# Patient Record
Sex: FEMALE | Race: BLACK OR AFRICAN AMERICAN | Employment: STUDENT | URBAN - METROPOLITAN AREA
[De-identification: names, ages, dates, MRNs, and addresses within clinical notes are randomized per-mention and may not be internally consistent; named-entity substitution may affect disease eponyms.]

---
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
## 2021-01-05 ENCOUNTER — OFFICE VISIT (OUTPATIENT)
Dept: FAMILY MEDICINE | Facility: CLINIC | Age: 21
End: 2021-01-05
Payer: COMMERCIAL

## 2021-01-05 VITALS
TEMPERATURE: 99 F | HEIGHT: 66 IN | BODY MASS INDEX: 42.91 KG/M2 | RESPIRATION RATE: 20 BRPM | HEART RATE: 96 BPM | WEIGHT: 267 LBS | SYSTOLIC BLOOD PRESSURE: 118 MMHG | OXYGEN SATURATION: 96 % | DIASTOLIC BLOOD PRESSURE: 80 MMHG

## 2021-01-05 DIAGNOSIS — R10.30 LOWER ABDOMINAL PAIN: ICD-10-CM

## 2021-01-05 DIAGNOSIS — R10.2 PELVIC CRAMPING: Primary | ICD-10-CM

## 2021-01-05 DIAGNOSIS — K59.00 CONSTIPATION, UNSPECIFIED CONSTIPATION TYPE: ICD-10-CM

## 2021-01-05 LAB
ALBUMIN UR-MCNC: NEGATIVE MG/DL
APPEARANCE UR: CLEAR
BACTERIA #/AREA URNS HPF: ABNORMAL /HPF
BILIRUB UR QL STRIP: NEGATIVE
COLOR UR AUTO: YELLOW
GLUCOSE UR STRIP-MCNC: NEGATIVE MG/DL
HCG UR QL: NEGATIVE
HGB UR QL STRIP: NEGATIVE
KETONES UR STRIP-MCNC: NEGATIVE MG/DL
LEUKOCYTE ESTERASE UR QL STRIP: NEGATIVE
NITRATE UR QL: NEGATIVE
NON-SQ EPI CELLS #/AREA URNS LPF: ABNORMAL /LPF
PH UR STRIP: 6 PH (ref 5–7)
RBC #/AREA URNS AUTO: ABNORMAL /HPF
SOURCE: ABNORMAL
SP GR UR STRIP: 1.02 (ref 1–1.03)
SPECIMEN SOURCE: NORMAL
UROBILINOGEN UR STRIP-ACNC: 0.2 EU/DL (ref 0.2–1)
WBC #/AREA URNS AUTO: ABNORMAL /HPF
WET PREP SPEC: NORMAL

## 2021-01-05 PROCEDURE — 87591 N.GONORRHOEAE DNA AMP PROB: CPT | Performed by: NURSE PRACTITIONER

## 2021-01-05 PROCEDURE — 81025 URINE PREGNANCY TEST: CPT | Performed by: NURSE PRACTITIONER

## 2021-01-05 PROCEDURE — 87491 CHLMYD TRACH DNA AMP PROBE: CPT | Performed by: NURSE PRACTITIONER

## 2021-01-05 PROCEDURE — 99214 OFFICE O/P EST MOD 30 MIN: CPT | Performed by: NURSE PRACTITIONER

## 2021-01-05 PROCEDURE — 81001 URINALYSIS AUTO W/SCOPE: CPT | Performed by: NURSE PRACTITIONER

## 2021-01-05 PROCEDURE — 87210 SMEAR WET MOUNT SALINE/INK: CPT | Performed by: NURSE PRACTITIONER

## 2021-01-05 ASSESSMENT — PAIN SCALES - GENERAL: PAINLEVEL: SEVERE PAIN (7)

## 2021-01-05 ASSESSMENT — MIFFLIN-ST. JEOR: SCORE: 2006.82

## 2021-01-05 NOTE — PROGRESS NOTES
"  Assessment & Plan     Pelvic cramping  Patient primarily concerned about possible pregnancy, requests hcg testing. Agrees to additional evaluation, though limited. Declines any blood tests or imaging today.     UA unremarkable.  Urine hcg negative.  Wet prep negative.  GC/CT pending.   Supportive care/pain management reviewed (ie increased fluids, ibuprofen PRN), as well as symptoms that would indicate need for prompt medical attention.     Advise OB/GYN visit for postpartum eval and for current symptoms.   F/u pending results.   Discussed contraceptive initiation, does not wish to decide today.     - UA with Microscopic reflex to Culture  - HCG Qual, Urine ([id number])  - NEISSERIA GONORRHOEA PCR  - CHLAMYDIA TRACHOMATIS PCR  - Wet prep    Lower abdominal pain  Multiple possible etiologies.  Constipation is likely factor, though cannot exclude alternative cause.    As noted above, reviewed symptoms that would indicate need for further eval.   If today's results normal, recommend imaging and additional assessment. Patient understands plan.  As her primary intent today is r/o pregnancy, patient declines additional testing.     Constipation, unspecified constipation type  Ongoing.   Discussed increased fluids/fiber in diet, and reviewed foods known to contribute to constipative symptoms.  Decrease fast food intake, discussed.   miralax once daily x 2 wks then PRN for ongoing constipation.       Review of prior external note(s) from - [facility] information from [facility] reviewed  Diagnosis or treatment significantly limited by social determinants of health - financial strain, transportation difficulties  [id number]}     BMI:   Estimated body mass index is 42.77 kg/m  as calculated from the following:    Height as of this encounter: 1.683 m (5' 6.25\").    Weight as of this encounter: 121.1 kg (267 lb).       Return in about 1 week (around 1/12/2021), or if symptoms worsen or fail to " improve.    RENEE Nevarez CNP  [facility]     Kathy Quintana is a 20 year old who presents to clinic today for the following health issues.        HPI     Lower abdominal/pelvic pain    Onset/Duration: 2 weeks  Description:   Character: Cramping  Location: lower abdomen  Radiation: None  Intensity: moderate, severe  Progression of Symptoms:  worsening  Accompanying Signs & Symptoms:  Fever/Chills: no  Gas/Bloating: no  Nausea: yes  Vomitting: no  Diarrhea: no  Constipation: yes BM every 3-4 days  Dysuria or Hematuria: no  History:   Trauma: no  Previous similar pain: no  Previous tests done: none  Precipitating factors:   Does the pain change with:     Food: no    Bowel Movement: no    Urination: no   Other factors:  no  Therapies tried and outcome: heat pad without relief, Tylenol with mild relief    Above HPI reviewed, additional history below:     Patient notes worsening pelvic pain, bilateral, with accompanying nausea and lower abdominal pain.  Pain is present both at rest and with activity, not relieved with BMs or passing gas.   No dysuria, no change in urine character or frequency. Patient does note history of UTI, describes current pain as dissimilar to that associated with UTI.      Denies breast tenderness, no vaginal discharge.  No vomiting, no diarrhea. History constipation, ongoing.  Has used miralax in past but not recently.  Typically has BMs q3-4 days.     Patient has otherwise been afebrile, with no symptoms of systemic illness.    Delivered son approx 3 months ago via C/S at 41wks GA.  No complications at that time reported, though states that she has not returned for postpartum OB visit.      States no menstrual periods since patient's birth.    Sexually active, most recent intercourse yesterday.  Inconsistent protection.       Review of Systems   Constitutional, neuro, ENT, endocrine, pulmonary, cardiac, gastrointestinal,  "genitourinary, musculoskeletal, integument and psychiatric systems are negative, except as otherwise noted.      Objective    /80 (BP Location: Left arm, Patient Position: Chair, Cuff Size: Adult Large)   Pulse 96   Temp 99  F (37.2  C) (Oral)   Resp 20   Ht 1.683 m (5' 6.25\")   Wt 121.1 kg (267 lb)   LMP 11/15/2020 (Approximate)   SpO2 96%   BMI 42.77 kg/m    Body mass index is 42.77 kg/m .  Physical Exam   GENERAL: healthy, alert. Moves slowly, with caution - particularly when holding infant.   EYES: Eyes grossly normal to inspection, PERRL and conjunctivae and sclerae normal  HENT: ear canals and TM's normal, mouth without ulcers or lesions  NECK: no adenopathy, no asymmetry, masses, or scars and thyroid normal to palpation  RESP: lungs clear to auscultation - no rales, rhonchi or wheezes  CV: regular rate and rhythm, normal S1 S2,no murmur  ABDOMEN: soft, nondistended, no hepatosplenomegaly, no masses and bowel sounds normal. Mild tenderness to lower abdomen bilaterally.   MS: no gross musculoskeletal defects noted, no edema  NEURO: Normal strength and tone, mentation intact and speech normal  BACK: no CVA tenderness    See above A/P.         "

## 2021-01-06 ENCOUNTER — TELEPHONE (OUTPATIENT)
Dept: FAMILY MEDICINE | Facility: CLINIC | Age: 21
End: 2021-01-06

## 2021-01-06 DIAGNOSIS — R10.32 ABDOMINAL PAIN, LEFT LOWER QUADRANT: ICD-10-CM

## 2021-01-06 DIAGNOSIS — R10.2 PELVIC CRAMPING: Primary | ICD-10-CM

## 2021-01-06 DIAGNOSIS — R52 POSTPARTUM PAIN: ICD-10-CM

## 2021-01-06 NOTE — TELEPHONE ENCOUNTER
Please call patient:     Remaining lab results, gonorrhea and chlamydia testing, are negative.     Given all results to date, if pain persists, we need to further evaluate for cause.  If no improvement, patient should return for labs. If pain significantly worsens, or is accompanied by vomiting, diarrhea, fever, or other concerning symptoms then patient should be seen right away in ED.     GYN referral also ordered, recommend evaluation promptly as patient not returned for 6-wk postpartum visit and needs GYN exam to further assess current symptoms.     As noted yesterday, increased fluids and attention to fiber intake, miralax use may help in managing constipation that is likely complicating current pain symptoms.     Please route any questions/concerns to provider.     Thanks,   CAROLINE Mckeon

## 2025-03-25 ENCOUNTER — VIRTUAL VISIT (OUTPATIENT)
Dept: FAMILY MEDICINE | Facility: CLINIC | Age: 25
End: 2025-03-25

## 2025-03-25 DIAGNOSIS — F43.23 ADJUSTMENT DISORDER WITH MIXED ANXIETY AND DEPRESSED MOOD: Primary | ICD-10-CM

## 2025-03-25 DIAGNOSIS — R53.83 OTHER FATIGUE: ICD-10-CM

## 2025-03-25 PROCEDURE — 98002 SYNCH AUDIO-VIDEO NEW MOD 45: CPT | Performed by: PHYSICIAN ASSISTANT

## 2025-03-25 NOTE — PROGRESS NOTES
Kathy is a 24 year old who is being evaluated via a billable video visit.    How would you like to obtain your AVS? MyChart  If the video visit is dropped, the invitation should be resent by: Text to cell phone: [phone]  Will anyone else be joining your video visit? No      Assessment & Plan     Adjustment disorder with mixed anxiety and depressed mood  New onset the last few months.  Would like to get some labs to rule out organic cause.  She did attach paperwork, but she says she is holding off on this until work up and mental health evaluation is done  - Adult Mental Health  Referral; Future  - TSH with free T4 reflex; Future  - Vitamin D Deficiency; Future  - Vitamin B12; Future    Other fatigue  As above  - CBC with platelets and differential; Future  - Comprehensive metabolic panel (BMP + Alb, Alk Phos, ALT, AST, Total. Bili, TP); Future  - TSH with free T4 reflex; Future  - Vitamin D Deficiency; Future  - Vitamin B12; Future                Subjective   Kathy is a 24 year old, presenting for the following health issues:  Depression        3/25/2025    12:23 PM   Additional Questions   Roomed by Milana BATEMAN     HPI        New onset fatigue, depressive and anxious symptoms over the last few months.  No real history of this in the past.  Has lots of stressors that are not helping.  Has not done any kind of treatment to help.  Unsure where to start      Review of Systems  Constitutional, HEENT, cardiovascular, pulmonary, gi and gu systems are negative, except as otherwise noted.      Objective           Vitals:  No vitals were obtained today due to virtual visit.    Physical Exam   GENERAL: alert and no distress  EYES: Eyes grossly normal to inspection.  No discharge or erythema, or obvious scleral/conjunctival abnormalities.  RESP: No audible wheeze, cough, or visible cyanosis.    SKIN: Visible skin clear. No significant rash, abnormal pigmentation or lesions.  NEURO: Cranial nerves grossly intact.   Mentation and speech appropriate for age.  PSYCH: Appropriate affect, tone, and pace of words          Video-Visit Details    Type of service:  Video Visit   Originating Location (pt. Location): Home    Distant Location (provider location):  Off-site  Platform used for Video Visit: [facility]  Signed Electronically by: Colin Butler PA-C

## 2025-03-29 ENCOUNTER — HEALTH MAINTENANCE LETTER (OUTPATIENT)
Age: 25
End: 2025-03-29